# Patient Record
Sex: FEMALE | Race: WHITE
[De-identification: names, ages, dates, MRNs, and addresses within clinical notes are randomized per-mention and may not be internally consistent; named-entity substitution may affect disease eponyms.]

---

## 2019-04-09 NOTE — EKG
[place] [address]
Phone:  [phone]                     ELECTROCARDIOGRAM REPORT      
_______________________________________________________________________________
 
Name:       CULLEN MALDONADO                Room:                      [facility]#:  [id number]      Account #:      [id number]  
Admission:  19     Attend Phys:                         
Discharge:  19     Date of Birth:  [birth year]  
         Report #: [id number]
    [id number]
_______________________________________________________________________________
THIS REPORT FOR:  //name//                      
 
                         [facility] ED
                                       
Test Date:    2019               Test Time:    06:11:03
Pat Name:     CULLEN MALDONADO            Department:   
Patient ID:   [id number]            Room:          
Gender:       F                        Technician:   
:          1960               Requested By: Willa Aparicio
Order Number: [id number] MD:   Aric Garcia
                                 Measurements
Intervals                              Axis          
Rate:         95                       P:            19
RI:           175                      QRS:          26
QRSD:         87                       T:            -4
QT:           374                                    
QTc:          470                                    
                           Interpretive Statements
Sinus rhythm
Borderline T abnormalities, anterior leads
Compared to ECG 2017 14:38:23
T-wave abnormality now present
 
Electronically Signed On 2019 15:54:31 CDT by Aric Garcia
[email or web address]
 
 
 
 
 
 
 
 
 
 
 
 
 
 
 
 
 
 
  <ELECTRONICALLY SIGNED>
                                           By: Aric Garcia MD, [facility]   
  19     1554
D: 19   _____________________________________
T: 19   Aric Garcia MD, FAC     /EPI

## 2019-06-25 NOTE — SLEEP
[address]  [address]                    SLEEP STUDY REPORT            
_______________________________________________________________________________
 
Name:       CULLEN MALDONADO                Room:                      REG CLI 
M.R.#:  [id number]      Account #:      [id number]  
Admission:  06/11/19     Attend Phys:    Eric Núñez MD  
Discharge:               Date of Birth:  [birth year]  
         Report #: [id number]
                                                                     [id number]  
_______________________________________________________________________________
THIS REPORT FOR:  //name//                      
 
CC: Wilton Núñez
 
This study has been reviewed in its entirety by a board certified sleep
specialist
 
DATE OF SERVICE:  06/13/2019
 
 
HOME SLEEP STUDY
 
ATTENDING PHYSICIAN:  Dr. Wilton Reyes.
 
The patient is 58 years old who weighs 225 pounds with a BMI of 42.5.  The
patient's [place] score was 13.  The patient underwent home sleep study
performed by [facility].  Total recording time was 377 minutes.
 
During the night study, the patient had 16 obstructive apneas, 1 central apnea,
no mixed apneas and 210 hypopneas.  The patient's apnea hypopnea index was 36
per hour.  Supine sleep was not recorded.
 
Nocturnal oximetry study revealed an average oxygen saturation of 90% with a
lowest of 76%.  118 minutes were spent at an oxygen saturation less than 90% and
another 20 minutes with saturation of less than 85%.
 
The mean heart rate was 91 beats per minute with a maximum of 108 beats per
minute.
 
IMPRESSION:
1.  Severe sleep apnea-hypopnea syndrome at an AHI of 36 per hour.
2.  Moderate nocturnal hypoxia secondary to obstructive sleep apnea.
 
RECOMMENDATIONS:
1.  The patient would benefit from in-lab CPAP titration study due to the
severity of sleep apnea.
2.  Once the patient is optimally treated, then follow up in 4-6 weeks to assess
compliance with CPAP and to document clinical improvement.
3.  Weight loss is strongly advised.
4.  Avoid CNS depressants.
 
 
 
[place] [address]                    SLEEP STUDY REPORT            
_______________________________________________________________________________
 
Name:       CULLEN MALDONADO                Room:                      REG CLI 
M.R.#:  [id number]      Account #:      [id number]  
Admission:  06/11/19     Attend Phys:    Eric Núñez MD  
Discharge:               Date of Birth:  [birth year]  
         Report #: [id number]
                                                                     [id number]  
_______________________________________________________________________________
5.  Cautioned regarding driving until symptoms of sleep apnea resolve with the
use of CPAP.
 
 
 
 
 
 
 
 
 
 
 
 
 
 
 
 
 
 
 
 
 
 
 
 
 
 
 
 
 
 
 
 
 
 
 
 
 
 
 
 
 
 
<ELECTRONICALLY SIGNED>
                                        By:  Giovanny Leonard MD              
06/25/19     2234
D: 06/25/19 1741_______________________________________
T: 06/25/19 Penelope Leonard MD                 /nt

## 2019-07-16 ENCOUNTER — HOSPITAL ENCOUNTER (OUTPATIENT)
Dept: HOSPITAL 96 - M.RAD | Age: 59
End: 2019-07-16
Attending: FAMILY MEDICINE
Payer: COMMERCIAL

## 2019-07-16 DIAGNOSIS — Z12.31: Primary | ICD-10-CM

## 2020-01-10 ENCOUNTER — HOSPITAL ENCOUNTER (OUTPATIENT)
Dept: HOSPITAL 96 - M.LAB | Age: 60
End: 2020-01-10
Attending: ANESTHESIOLOGY
Payer: COMMERCIAL

## 2020-01-10 DIAGNOSIS — E11.9: ICD-10-CM

## 2020-01-10 DIAGNOSIS — E87.6: Primary | ICD-10-CM

## 2020-01-10 LAB
GLUCOSE SERPL-MCNC: 101 MG/DL (ref 70–99)
POTASSIUM SERPL-SCNC: 3.3 MMOL/L (ref 3.5–5.1)

## 2020-06-11 ENCOUNTER — HOSPITAL ENCOUNTER (EMERGENCY)
Dept: HOSPITAL 96 - M.ERS | Age: 60
Discharge: HOME | End: 2020-06-11
Payer: COMMERCIAL

## 2020-06-11 VITALS — DIASTOLIC BLOOD PRESSURE: 64 MMHG | SYSTOLIC BLOOD PRESSURE: 117 MMHG

## 2020-06-11 VITALS — BODY MASS INDEX: 45.31 KG/M2 | HEIGHT: 61 IN | WEIGHT: 240 LBS

## 2020-06-11 DIAGNOSIS — I10: ICD-10-CM

## 2020-06-11 DIAGNOSIS — Z90.710: ICD-10-CM

## 2020-06-11 DIAGNOSIS — S86.002A: Primary | ICD-10-CM

## 2020-06-11 DIAGNOSIS — Z79.82: ICD-10-CM

## 2020-06-11 DIAGNOSIS — Z88.8: ICD-10-CM

## 2020-06-11 DIAGNOSIS — Z96.651: ICD-10-CM

## 2020-06-11 DIAGNOSIS — Z88.6: ICD-10-CM
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
## 2020-06-11 DIAGNOSIS — E78.00: ICD-10-CM
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
## 2020-06-11 DIAGNOSIS — I48.91: ICD-10-CM

## 2020-06-11 DIAGNOSIS — Z88.0: ICD-10-CM
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
## 2020-06-15 ENCOUNTER — HOSPITAL ENCOUNTER (OUTPATIENT)
Dept: HOSPITAL 96 - M.ULTRA | Age: 60
End: 2020-06-15
Attending: PHYSICIAN ASSISTANT
Payer: COMMERCIAL
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
## 2020-06-15 DIAGNOSIS — S86.002A: Primary | ICD-10-CM
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
## 2020-06-15 DIAGNOSIS — M77.8: ICD-10-CM
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
## 2020-06-15 DIAGNOSIS — M76.62: ICD-10-CM

## 2020-09-03 ENCOUNTER — HOSPITAL ENCOUNTER (EMERGENCY)
Dept: HOSPITAL 96 - M.ERS | Age: 60
Discharge: HOME | End: 2020-09-03
Payer: COMMERCIAL

## 2020-09-03 VITALS — WEIGHT: 240 LBS | HEIGHT: 61 IN | BODY MASS INDEX: 45.31 KG/M2

## 2020-09-03 VITALS — DIASTOLIC BLOOD PRESSURE: 73 MMHG | SYSTOLIC BLOOD PRESSURE: 136 MMHG

## 2020-09-03 DIAGNOSIS — J02.0: Primary | ICD-10-CM

## 2020-09-03 DIAGNOSIS — I48.91: ICD-10-CM

## 2020-09-03 DIAGNOSIS — Z96.653: ICD-10-CM

## 2020-09-03 DIAGNOSIS — I10: ICD-10-CM

## 2020-09-03 DIAGNOSIS — Z90.721: ICD-10-CM

## 2020-09-03 DIAGNOSIS — Z88.8: ICD-10-CM

## 2020-09-03 DIAGNOSIS — E78.00: ICD-10-CM

## 2020-09-03 DIAGNOSIS — Z20.828: ICD-10-CM

## 2020-09-03 DIAGNOSIS — Z90.710: ICD-10-CM

## 2020-09-03 DIAGNOSIS — Z88.0: ICD-10-CM

## 2020-09-03 DIAGNOSIS — Z88.6: ICD-10-CM

## 2020-12-20 ENCOUNTER — HOSPITAL ENCOUNTER (EMERGENCY)
Dept: HOSPITAL 96 - M.ERS | Age: 60
Discharge: HOME | End: 2020-12-20
Payer: COMMERCIAL

## 2020-12-20 VITALS — HEIGHT: 61 IN | WEIGHT: 210.01 LBS | BODY MASS INDEX: 39.65 KG/M2

## 2020-12-20 VITALS — DIASTOLIC BLOOD PRESSURE: 71 MMHG | SYSTOLIC BLOOD PRESSURE: 149 MMHG
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
## 2020-12-20 DIAGNOSIS — Z90.721: ICD-10-CM

## 2020-12-20 DIAGNOSIS — Z88.0: ICD-10-CM

## 2020-12-20 DIAGNOSIS — Z88.8: ICD-10-CM

## 2020-12-20 DIAGNOSIS — Z90.710: ICD-10-CM
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
## 2020-12-20 DIAGNOSIS — Z98.51: ICD-10-CM

## 2020-12-20 DIAGNOSIS — Z96.653: ICD-10-CM

## 2020-12-20 DIAGNOSIS — S61.203A: Primary | ICD-10-CM

## 2020-12-20 DIAGNOSIS — I10: ICD-10-CM

## 2020-12-20 DIAGNOSIS — Z88.6: ICD-10-CM
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
## 2020-12-20 DIAGNOSIS — E78.00: ICD-10-CM

## 2020-12-20 DIAGNOSIS — I48.91: ICD-10-CM

## 2021-01-10 ENCOUNTER — HOSPITAL ENCOUNTER (EMERGENCY)
Dept: HOSPITAL 96 - M.ERS | Age: 61
Discharge: HOME | End: 2021-01-10
Payer: COMMERCIAL

## 2021-01-10 VITALS — WEIGHT: 240 LBS | HEIGHT: 61 IN | BODY MASS INDEX: 45.31 KG/M2

## 2021-01-10 VITALS — SYSTOLIC BLOOD PRESSURE: 142 MMHG | DIASTOLIC BLOOD PRESSURE: 81 MMHG

## 2021-01-10 DIAGNOSIS — Z20.828: ICD-10-CM

## 2021-01-10 DIAGNOSIS — Z88.0: ICD-10-CM

## 2021-01-10 DIAGNOSIS — R51.9: ICD-10-CM

## 2021-01-10 DIAGNOSIS — Z90.710: ICD-10-CM

## 2021-01-10 DIAGNOSIS — R50.9: Primary | ICD-10-CM

## 2021-01-10 DIAGNOSIS — I10: ICD-10-CM

## 2021-01-10 DIAGNOSIS — Z79.899: ICD-10-CM

## 2021-01-10 DIAGNOSIS — I48.91: ICD-10-CM

## 2021-01-10 DIAGNOSIS — Z79.82: ICD-10-CM

## 2021-01-10 DIAGNOSIS — Z88.6: ICD-10-CM

## 2021-08-30 ENCOUNTER — HOSPITAL ENCOUNTER (OUTPATIENT)
Dept: HOSPITAL 96 - M.LAB | Age: 61
End: 2021-08-30
Attending: FAMILY MEDICINE
Payer: COMMERCIAL

## 2021-08-30 DIAGNOSIS — Z12.31: Primary | ICD-10-CM

## 2021-08-30 DIAGNOSIS — E78.5: ICD-10-CM

## 2021-08-30 DIAGNOSIS — Z86.010: ICD-10-CM

## 2021-08-30 DIAGNOSIS — E06.3: ICD-10-CM

## 2021-08-30 DIAGNOSIS — I10: ICD-10-CM

## 2021-08-30 DIAGNOSIS — E11.59: ICD-10-CM

## 2021-08-30 LAB
ABSOLUTE BASOPHILS: 0.1 THOU/UL (ref 0–0.2)
ABSOLUTE EOSINOPHILS: 0.2 THOU/UL (ref 0–0.7)
ABSOLUTE MONOCYTES: 0.4 THOU/UL (ref 0–1.2)
ALBUMIN SERPL-MCNC: 3.3 G/DL (ref 3.4–5)
ALP SERPL-CCNC: 99 U/L (ref 46–116)
ALT SERPL-CCNC: 25 U/L (ref 30–65)
ANION GAP SERPL CALC-SCNC: 4 MMOL/L (ref 7–16)
AST SERPL-CCNC: 18 U/L (ref 15–37)
BASOPHILS NFR BLD AUTO: 0.9 %
BILIRUB SERPL-MCNC: 0.3 MG/DL
BUN SERPL-MCNC: 14 MG/DL (ref 7–18)
CALCIUM SERPL-MCNC: 9.2 MG/DL (ref 8.5–10.1)
CHLORIDE SERPL-SCNC: 105 MMOL/L (ref 98–107)
CHOLEST SERPL-MCNC: 187 MG/DL (ref ?–200)
CO2 SERPL-SCNC: 34 MMOL/L (ref 21–32)
CREAT SERPL-MCNC: 0.7 MG/DL (ref 0.6–1.3)
EOSINOPHIL NFR BLD: 2.1 %
GLUCOSE SERPL-MCNC: 91 MG/DL (ref 70–99)
GRANULOCYTES NFR BLD MANUAL: 62.5 %
HCT VFR BLD CALC: 40.3 % (ref 37–47)
HDLC SERPL-MCNC: 41 MG/DL (ref 40–?)
HGB BLD-MCNC: 13.6 GM/DL (ref 12–15)
LDLC SERPL-MCNC: 90 MG/DL (ref ?–100)
LYMPHOCYTES # BLD: 2.6 THOU/UL (ref 0.8–5.3)
LYMPHOCYTES NFR BLD AUTO: 30 %
MAGNESIUM SERPL-MCNC: 1.8 MG/DL (ref 1.8–2.4)
MCH RBC QN AUTO: 30.3 PG (ref 26–34)
MCHC RBC AUTO-ENTMCNC: 33.7 G/DL (ref 28–37)
MCV RBC: 89.7 FL (ref 80–100)
MONOCYTES NFR BLD: 4.5 %
MPV: 6.7 FL. (ref 7.2–11.1)
NEUTROPHILS # BLD: 5.5 THOU/UL (ref 1.6–8.1)
NUCLEATED RBCS: 0 /100WBC
PHOSPHATE SERPL-MCNC: 4 MG/DL (ref 2.5–4.9)
PLATELET COUNT*: 243 THOU/UL (ref 150–400)
POTASSIUM SERPL-SCNC: 3.9 MMOL/L (ref 3.5–5.1)
PROT SERPL-MCNC: 7.4 G/DL (ref 6.4–8.2)
RBC # BLD AUTO: 4.49 MIL/UL (ref 4.2–5)
RDW-CV: 14.4 % (ref 10.5–14.5)
SODIUM SERPL-SCNC: 143 MMOL/L (ref 136–145)
TC:HDL: 4.6 RATIO
TRIGL SERPL-MCNC: 282 MG/DL (ref ?–150)
VLDLC SERPL CALC-MCNC: 56 MG/DL (ref ?–40)
WBC # BLD AUTO: 8.8 THOU/UL (ref 4–11)

## 2021-08-31 LAB
EST. AVERAGE GLUCOSE BLD GHB EST-MCNC: 134 MG/DL
GLYCOHEMOGLOBIN (HGB A1C): 6.3 % (ref 4.8–5.6)